# Patient Record
(demographics unavailable — no encounter records)

---

## 2024-10-14 NOTE — PHYSICAL EXAM
[General Appearance - Alert] : alert [General Appearance - In No Acute Distress] : in no acute distress [Auscultation Breath Sounds / Voice Sounds] : lungs were clear to auscultation bilaterally [Heart Rate And Rhythm] : heart rate was normal and rhythm regular [Heart Sounds] : normal S1 and S2 [Heart Sounds Gallop] : no gallops [Murmurs] : no murmurs [Heart Sounds Pericardial Friction Rub] : no pericardial rub [Bowel Sounds] : normal bowel sounds [Abdomen Soft] : soft [Abdomen Tenderness] : non-tender [] : no hepato-splenomegaly [Abdomen Mass (___ Cm)] : no abdominal mass palpated [Costovertebral Angle Tenderness] : no CVA tenderness [FreeTextEntry1] : b/l healed groin wounds. b/l le edema

## 2024-10-14 NOTE — ASSESSMENT
[FreeTextEntry1] : 76 y/o F  with a history of   TEVAR for (TAAA) and AAA s/p EVAR on 10/19 , History of left le Ischemia ( acutely occluded left iliac graft) resulting in Right to left femoral ptfe bypass and left lower ext fasciotomies 11/5, and ORIF for left hip fracture on 11/7.  Patient sent to the ED for evaluation for suspected left groin wound infection.    - s/p left groin washout and sartorius muscle flap >>  Old and acute hematomas found in deep and superficial tissue, serosanguinous drainage -  OR cultures swab and tissue + PsA, f/u (S) cefepime - bcx ngtd - d/w Dr Peter, concern for graft infection which cannot be removed - discharged on cefepime 2 g iv q8h via picc with weekly CBc CMp ESr CRP end 1/19/24.  - she completed abx - had f/u CTA which reported persistent fluid collection adjacent to graft anastomosis in left groin - ESr and crp had improved - since the graft was exposed it is considered chronically infected and ideally this should be removed - spoke to Dr Peter graft removal not feasible and may cause significant morbidity /mortality for the patient - therefore if she does not remain on suppressive antibiotics there is a high change infection can recur and should she become bacteremic she may never clear her cultures which can become life threatening - unfortunately the only oral option for Pseudomonas for suppression is quinolones -which are contraindicated in patients with aneurysms. in this case benefits outweigh risks of use. she will be frequently monitored with labs and ekg for qtc - she has now been on ciprofloxacin and is tolerating it well thus far - will check CBC CMP, CRP ESR, ekg- unable to obtain EKG today she will return for this - she will f/u with vascular surgery for ct every 3-6 months- next CT is next week and then will see Dr Peter after that - she is aware of risk of qtc prolongation, aneurysms, tendon rupture, diarrhea, VOLODYMYR, c diff, death - c/w ciprofloxacin    f/u next month prognosis guarded [Treatment Education] : treatment education [Treatment Adherence] : treatment adherence [Rx Dose / Side Effects] : Rx dose/side effects [Risk Reduction] : risk reduction [Drug Interactions / Side Effects] : drug interactions/side effects [Disclosure of Diagnosis] : disclosure of diagnosis [Anticipatory Guidance] : anticipatory guidance

## 2024-10-14 NOTE — HISTORY OF PRESENT ILLNESS
[FreeTextEntry1] :  Hospital Course: Discharge Date	28-Nov-2023 Admission Date	21-Nov-2023 21:02 Reason for Admission	left groin draining wound Hospital Course	 76 y/o F  with a history of   TEVAR for (TAAA) and AAA s/p EVAR on 10/19 , History of left le Ischemia ( acutely occluded left iliac graft) resulting in Right to left femoral ptfe bypass and left lower ext fasciotomies 11/5, and ORIF for left hip fracture on 11/7.  Patient sent to the ED for evaluation for suspected left groin wound infection.    - s/p left groin washout and sartorius muscle flap >>  Old and acute hematomas found in deep and superficial tissue, serosanguinous drainage -  OR cultures swab and tissue + PsA, f/u (S) cefepime - bcx ngtd - dc zosyn-->cefepime 2g IV q8h - d/w Dr Peter, concern for graft infection which cannot be removed - discharged on cefepime 2 g iv q8h via picc with weekly CBc CMp ESr CRP end 1/19/24.  - she completed abx - had f/u CTA which reported persistent fluid collection adjacent to graft anastomosis in left groin - ESr and crp had improved  patient is doing well and has no fever she has a wound on LLE for which she is getting wound care and was told she may need a vac she was sent here by vascular surgery to discuss chronic antibiotic suppression  Interval f/u 2/7/24- pt is tolerating cipro started probiotics feels some numbness at left groin, no pain  Interval f/u 3/11/24- pt is doing well no diarrhea no symptoms LLE wound healed -has a scab planning to go to [place] beginning of may having f/u Ct after that   Interval f/u 4/15/24 pt here for f/u doing well no diarrhea no fever on probiotics  xarelto changed to plavix doing acupuncture almost done with PT notes some pain at distal lle calf medial scar, states may have banged it going for cruise next month   Interval f/u 6/5/24- doing well no diarrhea saw Dr Peter had Ct- reviewed no signs of infection no fever did note some hip pain has to see [facility] tolerating cipro noted skin peeling only over arms and lower legs x 1 month ? photosensitivity  arms and calf with hypopigmented scars did note weight gain-using probiotics and yogurt not eating much  Interval f/u 10/14/24 here for f/u visit saw derm using lotion for dryness-better stopped tizanadine she had developed back pain from PT for legs, went to see ortho spine, they recommended pain management who prescribed tizanadine. received epidural for back pain which helped somewhat started olmesartan and hctz, urinating frequently noted some discomfort to right lq denies chest pain palpitations fever diarrhea

## 2024-11-05 NOTE — ASSESSMENT
[FreeTextEntry1] : 75-year-old female s/p infrarenal aortic aneurysm repair with thrombosis of left iliac limb requiring a right to left femorofemoral bypass with subsequent infection and sartorius flaps currently on suppressive abx. Patient with recent bilateral PEs. Continues to on AC.  I reviewed her most recent CAT scan.  She likely has a 1B endoleak with sac growth.  Her aneurysm continues into the mesentery with slight increase in size.  Somehow her infrarenal segment is stable.  Treatment of this remaining segment will be quite difficult.  Perhaps she is a candidate for TAMBE graft.  That being said treatment would be difficult and she is on suppressive antibiotics for pseudomonal infection.  I would like her to come to the office next week for evaluation by myself and Dr. Encinas  and further conversations regarding possible treatment options.

## 2024-11-05 NOTE — PHYSICAL EXAM
[de-identified] : Appears well [FreeTextEntry1] : Palpable PT pulses bilateral [de-identified] : Groin incision is clean and intact.

## 2024-11-05 NOTE — HISTORY OF PRESENT ILLNESS
[FreeTextEntry1] : 75-year-old female s/p infrarenal aortic aneurysm repair with thrombosis of left iliac limb requiring a right to left femorofemoral bypass with subsequent infection and sartorius flaps. Patient was recently hospitalized [facility] for bilateral PE findings on CTA chest abd/pelvis, further LLE duplex demonstrated acute DVT gastrocnemius vein. CTA ABd/pelvis with continued endoleaks and unchanged excluded 5.8cm AAA and occlusion Left VY stent with patent bypass based on evaluation of the CAT scan.  There is growth in the descending thoracic component with possibly 1B endoleak.  The visceral component is also increased.. She is being treated with suppressive antibiotics for pseudomonal infection.  In general she just feels tired.

## 2024-11-08 NOTE — CONSULT LETTER
[Dear  ___] : Dear  [unfilled], [Courtesy Letter:] : I had the pleasure of seeing your patient, [unfilled], in my office today. [Please see my note below.] : Please see my note below. [Referral Closing:] : Thank you very much for seeing this patient.  If you have any questions, please do not hesitate to contact me. [Sincerely,] : Sincerely, [FreeTextEntry2] : Dr Mathew [FreeTextEntry3] : Miguel nAgel Loo MD Chief of Cardiovascular & Thoracic Surgery System Director of Endovascular & Cardiovascular Surgery  Cardiovascular & Thoracic Surgery [facility]  [place] [address] [place] [address] Tel   [phone] Fax  [phone]

## 2024-11-08 NOTE — DATA REVIEWED
[FreeTextEntry1] : CTA ABD Pelvis performed on 10/21/2024 showed IMPRESSION: Bilateral pulmonary emboli without evidence cardiac strain.  Thoracic and abdominal aortic aneurysms with indwelling stents and endoleaks similar to prior examination 05/16/2024  Unchanged occlusion left common iliac stent limb with patent femorofemoral bypass graft  Findings discussed with FRANCINE Alberts on 10/21/2024 at 11:00 AM  with read back.

## 2024-11-08 NOTE — CONSULT LETTER
[Dear  ___] : Dear  [unfilled], [Courtesy Letter:] : I had the pleasure of seeing your patient, [unfilled], in my office today. [Please see my note below.] : Please see my note below. [Referral Closing:] : Thank you very much for seeing this patient.  If you have any questions, please do not hesitate to contact me. [Sincerely,] : Sincerely, [FreeTextEntry2] : Dr Mathew [FreeTextEntry3] : Miguel Angel Loo MD Chief of Cardiovascular & Thoracic Surgery System Director of Endovascular & Cardiovascular Surgery  Cardiovascular & Thoracic Surgery [facility]  [place] [address] [place] [address] Tel   [phone] Fax  [phone]

## 2024-11-08 NOTE — REASON FOR VISIT
[Follow-Up: _____] : a [unfilled] follow-up visit [FreeTextEntry1] : excluded 5.8 cm AAA and occlusion left VY stent with patent bypass

## 2024-11-11 NOTE — PHYSICAL EXAM
[Sclera] : the sclera and conjunctiva were normal [Neck Appearance] : the appearance of the neck was normal [] : no respiratory distress [Apical Impulse] : the apical impulse was normal [Examination Of The Chest] : the chest was normal in appearance [2+] : left 2+ [Breast Appearance] : normal in appearance [Bowel Sounds] : normal bowel sounds [External Female Genitalia] : normal external genitalia [Cervical Lymph Nodes Enlarged Posterior Bilaterally] : posterior cervical [No CVA Tenderness] : no ~M costovertebral angle tenderness [Abnormal Walk] : normal gait [Skin Color & Pigmentation] : normal skin color and pigmentation [Cranial Nerves] : cranial nerves 2-12 were intact [Oriented To Time, Place, And Person] : oriented to person, place, and time [de-identified] : Appears well [FreeTextEntry1] : Positive bilateral DP PT signals [de-identified] : Bilateral groin incisions are well-healed.

## 2024-11-11 NOTE — HISTORY OF PRESENT ILLNESS
[FreeTextEntry1] :      Mrs. Meeks is a 76-year-old female presenting today for a follow-up CT scan regarding abdominal aortic aneurysm. Most recent CT scan showed a 5.8 cm AAA and an occluded left VY stent, with a patent bypass. Her history includes a prior infrarenal aortic aneurysm repair 10/19/2023, complicated by thrombosis of the iliac limb, which required a right-to-left femorofemoral bypass, followed by infection and sartorius flaps. Recently, she was hospitalized at [facility] for bilateral pulmonary emboli found on CTA of the chest, abdomen, and pelvis. A LLE duplex revealed an acute DVT in the gastrocnemius vein currently on eliquis). Her latest CTA abdomen/pelvis showed continued endoleaks, an unchanged 5.8 cm AAA, and persistent occlusion of the left VY stent with a patent bypass. There is noted growth in the descending thoracic component, possibly indicating a Type 1B endoleak, as well as an increase in the visceral component. She is currently on suppressive antibiotics for a pseudomonal infection. Her medical history is notable for HTN, HLD, myocardial infarctions, and prediabetes. Today, she reports feeling well and offers no complaints.       Today, I had the pleasure of evaluating Mrs. Amaro during her follow-up office visit. She presented to the office in to review the recent surveillance CT scan related to her AAA. Her latest CT scan revealed a 5.8 cm AAA and an occluded left VY stent, with a patent bypass.  Her medical history includes a previous infrarenal aortic aneurysm repair on 10/19/2023, which was complicated by thrombosis of the iliac limb, requiring a right-to-left femorofemoral bypass, followed by infection and sartorius flaps. Recently, she was hospitalized at [facility] for bilateral pulmonary emboli, discovered through a CTA of the chest, abdomen, and pelvis. A lower left extremity (LLE) duplex revealed an acute DVT in the gastrocnemius vein, for which she is currently on Eliquis. Her latest CTA of the abdomen and pelvis showed ongoing endoleaks, an unchanged 5.8 cm AAA, and a persistent occlusion of the left VY stent with a patent bypass. Additionally, there is growth noted in the descending thoracic component, which may indicate a Type 1B endoleak, and an increase in the visceral component. She is currently on suppressive antibiotics to treat a pseudomonal infection. At this time, I am recommending surgical intervention with a TAMBE graft; however, we will need to wait until we have completed the necessary training and have access to the graft, anticipated to be available in late winter or early spring. Mrs. Amaro expressed an understanding of the current plan and agreed to adhere to it.  Plan: -Obtain Mrs. Amaro's latest CT C/A/P on CD from [facility]. -She will require a TAMBIE graft, but we will wait until we are fully trained and have access to the graft before proceeding. -We will hold off on repeating her CTA until we have a clearer timeline for the procedure, likely targeting late winter or early spring.

## 2024-11-11 NOTE — ASSESSMENT
[FreeTextEntry1] : Today, I had the pleasure of evaluating Mrs. Amaro during her follow-up office visit. She presented to the office in to review the recent surveillance CT scan related to her AAA. Her latest CT scan revealed a 5.8 cm AAA and an occluded left VY stent, with a patent bypass.  Her medical history includes a previous infrarenal aortic aneurysm repair on 10/19/2023, which was complicated by thrombosis of the iliac limb, requiring a right-to-left femorofemoral bypass, followed by infection and sartorius flaps. Recently, she was hospitalized at [facility] for bilateral pulmonary emboli, discovered through a CTA of the chest, abdomen, and pelvis. A lower left extremity (LLE) duplex revealed an acute DVT in the gastrocnemius vein, for which she is currently on Eliquis. Her latest CTA of the abdomen and pelvis showed ongoing endoleaks, an unchanged 5.8 cm AAA, and a persistent occlusion of the left VY stent with a patent bypass. Additionally, there is growth noted in the descending thoracic component, which may indicate a Type 1B endoleak, and an increase in the visceral component. She is currently on suppressive antibiotics to treat a pseudomonal infection. At this time, I am recommending surgical intervention with a TAMBE graft; however, we will need to wait until we have completed the necessary training and have access to the graft, anticipated to be available in late winter or early spring. Mrs. Amaro expressed an understanding of the current plan and agreed to adhere to it.  Plan: -Obtain Mrs. Amaro's latest CT C/A/P on CD from [facility]. -She will require a TAMBIE graft, but we will wait until we are fully trained and have access to the graft before proceeding. -We will hold off on repeating her CTA until we have a clearer timeline for the procedure, likely targeting late winter or early spring.

## 2024-11-11 NOTE — PHYSICAL EXAM
[Sclera] : the sclera and conjunctiva were normal [Neck Appearance] : the appearance of the neck was normal [] : no respiratory distress [Apical Impulse] : the apical impulse was normal [Examination Of The Chest] : the chest was normal in appearance [2+] : left 2+ [Breast Appearance] : normal in appearance [Bowel Sounds] : normal bowel sounds [External Female Genitalia] : normal external genitalia [Cervical Lymph Nodes Enlarged Posterior Bilaterally] : posterior cervical [No CVA Tenderness] : no ~M costovertebral angle tenderness [Abnormal Walk] : normal gait [Skin Color & Pigmentation] : normal skin color and pigmentation [Cranial Nerves] : cranial nerves 2-12 were intact [Oriented To Time, Place, And Person] : oriented to person, place, and time [de-identified] : Appears well [FreeTextEntry1] : Positive bilateral DP PT signals [de-identified] : Bilateral groin incisions are well-healed.

## 2024-11-11 NOTE — HISTORY OF PRESENT ILLNESS
[FreeTextEntry1] :      Mrs. Meeks is a 76-year-old female presenting today for a follow-up CT scan regarding abdominal aortic aneurysm. Most recent CT scan showed a 5.8 cm AAA and an occluded left VY stent, with a patent bypass. Her history includes a prior infrarenal aortic aneurysm repair 10/19/2023, complicated by thrombosis of the iliac limb, which required a right-to-left femorofemoral bypass, followed by infection and sartorius flaps. Recently, she was hospitalized at [facility] for bilateral pulmonary emboli found on CTA of the chest, abdomen, and pelvis. A LLE duplex revealed an acute DVT in the gastrocnemius vein currently on eliquis). Her latest CTA abdomen/pelvis showed continued endoleaks, an unchanged 5.8 cm AAA, and persistent occlusion of the left VY stent with a patent bypass. There is noted growth in the descending thoracic component, possibly indicating a Type 1B endoleak, as well as an increase in the visceral component. She is currently on suppressive antibiotics for a pseudomonal infection. Her medical history is notable for HTN, HLD, myocardial infarctions, and prediabetes. Today, she reports feeling well and offers no complaints.       Today, I had the pleasure of evaluating Mrs. Amaro during her follow-up office visit. She presented to the office in to review the recent surveillance CT scan related to her AAA. Her latest CT scan revealed a 5.8 cm AAA and an occluded left VY stent, with a patent bypass.  Her medical history includes a previous infrarenal aortic aneurysm repair on 10/19/2023, which was complicated by thrombosis of the iliac limb, requiring a right-to-left femorofemoral bypass, followed by infection and sartorius flaps. Recently, she was hospitalized at [facility] for bilateral pulmonary emboli, discovered through a CTA of the chest, abdomen, and pelvis. A lower left extremity (LLE) duplex revealed an acute DVT in the gastrocnemius vein, for which she is currently on Eliquis. Her latest CTA of the abdomen and pelvis showed ongoing endoleaks, an unchanged 5.8 cm AAA, and a persistent occlusion of the left VY stent with a patent bypass. Additionally, there is growth noted in the descending thoracic component, which may indicate a Type 1B endoleak, and an increase in the visceral component. She is currently on suppressive antibiotics to treat a pseudomonal infection. At this time, I am recommending surgical intervention with a TAMBE graft; however, we will need to wait until we have completed the necessary training and have access to the graft, anticipated to be available in late winter or early spring. Mrs. Amaro expressed an understanding of the current plan and agreed to adhere to it.  Plan: -Obtain Mrs. Amaro's latest CT C/A/P on CD from [facility]. -She will require a TAMBIE graft, but we will wait until we are fully trained and have access to the graft before proceeding. -We will hold off on repeating her CTA until we have a clearer timeline for the procedure, likely targeting late winter or early spring.

## 2024-11-11 NOTE — ASSESSMENT
[FreeTextEntry1] : Today, I had the pleasure of evaluating Mrs. Amaro during her follow-up office visit. She presented to the office in to review the recent surveillance CT scan related to her AAA. Her latest CT scan revealed a 5.8 cm AAA and an occluded left VY stent, with a patent bypass.  Her medical history includes a previous infrarenal aortic aneurysm repair on 10/19/2023, which was complicated by thrombosis of the iliac limb, requiring a right-to-left femorofemoral bypass, followed by infection and sartorius flaps. Recently, she was hospitalized at [facility] for bilateral pulmonary emboli, discovered through a CTA of the chest, abdomen, and pelvis. A lower left extremity (LLE) duplex revealed an acute DVT in the gastrocnemius vein, for which she is currently on Eliquis. Her latest CTA of the abdomen and pelvis showed ongoing endoleaks, an unchanged 5.8 cm AAA, and a persistent occlusion of the left VY stent with a patent bypass. Additionally, there is growth noted in the descending thoracic component, which may indicate a Type 1B endoleak, and an increase in the visceral component. She is currently on suppressive antibiotics to treat a pseudomonal infection. At this time, I am recommending surgical intervention with a TAMBE graft; however, we will need to wait until we have completed the necessary training and have access to the graft, anticipated to be available in late winter or early spring. Mrs. Amaro expressed an understanding of the current plan and agreed to adhere to it.  Plan: -Obtain Mrs. Amaro's latest CT C/A/P on CD from [facility]. -She will require a TAMBIE graft, but we will wait until we are fully trained and have access to the graft before proceeding. -We will hold off on repeating her CTA until we have a clearer timeline for the procedure, likely targeting late winter or early spring.

## 2024-11-22 NOTE — ASSESSMENT
[FreeTextEntry1] : Mrs. Meeks is a 76-year-old female presenting today for a follow-up CT scan regarding abdominal aortic aneurysm previously treated with EVAR now with thoracic extension and endoleak.  Her medical history is notable for HTN, HLD, myocardial infarctions, and prediabetes.  Her medical history includes a previous infrarenal aortic aneurysm repair on 10/19/2023, which was complicated by thrombosis of the iliac limb, requiring a right-to-left femorofemoral bypass, followed by infection and sartorius flaps. Recently, she was hospitalized at [facility] for bilateral pulmonary emboli, discovered through a CTA of the chest, abdomen, and pelvis. A lower left extremity (LLE) duplex revealed an acute DVT in the gastrocnemius vein, for which she is currently on Eliquis. Her latest CTA of the abdomen and pelvis showed ongoing endoleaks, an unchanged 5.8 cm AAA, and a persistent occlusion of the left VY stent with a patent bypass. Additionally, there is growth noted in the descending thoracic component, which may indicate a Type 1B endoleak, and an increase in the visceral component. She is currently on suppressive antibiotics to treat a pseudomonal infection. At this time, I am recommending surgical intervention with a TAMBE graft; however, we will need to wait until we have completed the necessary training and have access to the graft, anticipated to be available in late winter or early spring. Mrs. Amaro expressed an understanding of the current plan and agreed to adhere to it.  Plan: -Obtain Mrs. Amaro's latest CT C/A/P on CD from [facility]. -She will require a TAMBIE graft, but we will wait until we are fully trained and have access to the graft before proceeding. -We will hold off on repeating her CTA until we have a clearer timeline for the procedure, likely targeting late winter or early spring.    I, Dr. Loo personally performed the evaluation and management (E/M) services for this patient. That E/M includes conducting the initial examination, assessing all conditions, and establishing the plan of care. Today, Roberto Carlos Ellison NP was here to observe my evaluation and management services for this patient.

## 2024-11-22 NOTE — DATA REVIEWED
[FreeTextEntry1] : Independent review of the following imaging and independent interpretation was performed at today's visit: - 10/30/24 CT C/A/P at [facility]

## 2024-11-22 NOTE — ASSESSMENT
[FreeTextEntry1] : Mrs. Meeks is a 76-year-old female presenting today for a follow-up CT scan regarding abdominal aortic aneurysm previously treated with EVAR now with thoracic extension and endoleak.  Her medical history is notable for HTN, HLD, myocardial infarctions, and prediabetes.  Her medical history includes a previous infrarenal aortic aneurysm repair on 10/19/2023, which was complicated by thrombosis of the iliac limb, requiring a right-to-left femorofemoral bypass, followed by infection and sartorius flaps. Recently, she was hospitalized at [facility] for bilateral pulmonary emboli, discovered through a CTA of the chest, abdomen, and pelvis. A lower left extremity (LLE) duplex revealed an acute DVT in the gastrocnemius vein, for which she is currently on Eliquis. Her latest CTA of the abdomen and pelvis showed ongoing endoleaks, an unchanged 5.8 cm AAA, and a persistent occlusion of the left VY stent with a patent bypass. Additionally, there is growth noted in the descending thoracic component, which may indicate a Type 1B endoleak, and an increase in the visceral component. She is currently on suppressive antibiotics to treat a pseudomonal infection. At this time, I am recommending surgical intervention with a TAMBE graft; however, we will need to wait until we have completed the necessary training and have access to the graft, anticipated to be available in late winter or early spring. Mrs. Amaro expressed an understanding of the current plan and agreed to adhere to it.  Plan: -Obtain Mrs. Amaro's latest CT C/A/P on CD from Brian Dunphy. -She will require a TAMBIE graft, but we will wait until we are fully trained and have access to the graft before proceeding. -We will hold off on repeating her CTA until we have a clearer timeline for the procedure, likely targeting late winter or early spring.    I, Dr. Loo personally performed the evaluation and management (E/M) services for this patient. That E/M includes conducting the initial examination, assessing all conditions, and establishing the plan of care. Today, Roberto Carlos Ellison NP was here to observe my evaluation and management services for this patient.

## 2024-11-22 NOTE — PHYSICAL EXAM
[General Appearance - Alert] : alert [General Appearance - In No Acute Distress] : in no acute distress [Auscultation Breath Sounds / Voice Sounds] : lungs were clear to auscultation bilaterally [Heart Rate And Rhythm] : heart rate was normal and rhythm regular [Heart Sounds] : normal S1 and S2 [Heart Sounds Gallop] : no gallops [Murmurs] : no murmurs [Heart Sounds Pericardial Friction Rub] : no pericardial rub [Bowel Sounds] : normal bowel sounds [Abdomen Soft] : soft [Abdomen Tenderness] : non-tender [] : no hepato-splenomegaly [Abdomen Mass (___ Cm)] : no abdominal mass palpated [Costovertebral Angle Tenderness] : no CVA tenderness [FreeTextEntry1] : left anterior calf healed scar medial calf healed scar. no erythema warmth or open wounds

## 2024-11-22 NOTE — HISTORY OF PRESENT ILLNESS
[FreeTextEntry1] :  Hospital Course: Discharge Date	28-Nov-2023 Admission Date	21-Nov-2023 21:02 Reason for Admission	left groin draining wound Hospital Course	 76 y/o F  with a history of   TEVAR for (TAAA) and AAA s/p EVAR on 10/19 , History of left le Ischemia ( acutely occluded left iliac graft) resulting in Right to left femoral ptfe bypass and left lower ext fasciotomies 11/5, and ORIF for left hip fracture on 11/7.  Patient sent to the ED for evaluation for suspected left groin wound infection.    - s/p left groin washout and sartorius muscle flap >>  Old and acute hematomas found in deep and superficial tissue, serosanguinous drainage -  OR cultures swab and tissue + PsA, f/u (S) cefepime - bcx ngtd - dc zosyn-->cefepime 2g IV q8h - d/w Dr Peter, concern for graft infection which cannot be removed - discharged on cefepime 2 g iv q8h via picc with weekly CBc CMp ESr CRP end 1/19/24.  - she completed abx - had f/u CTA which reported persistent fluid collection adjacent to graft anastomosis in left groin - ESr and crp had improved  patient is doing well and has no fever she has a wound on LLE for which she is getting wound care and was told she may need a vac she was sent here by vascular surgery to discuss chronic antibiotic suppression  Interval f/u 2/7/24- pt is tolerating cipro started probiotics feels some numbness at left groin, no pain  Interval f/u 3/11/24- pt is doing well no diarrhea no symptoms LLE wound healed -has a scab planning to go to [place] beginning of may having f/u Ct after that   Interval f/u 4/15/24 pt here for f/u doing well no diarrhea no fever on probiotics  xarelto changed to plavix doing acupuncture almost done with PT notes some pain at distal lle calf medial scar, states may have banged it going for cruise next month   Interval f/u 6/5/24- doing well no diarrhea saw Dr Peter had Ct- reviewed no signs of infection no fever did note some hip pain has to see [facility] tolerating cipro noted skin peeling only over arms and lower legs x 1 month ? photosensitivity  arms and calf with hypopigmented scars did note weight gain-using probiotics and yogurt not eating much  Interval f/u 11/18/24- pt underwent CTA found to have ongoing endoleaks and growth in descending thoracic aneursym she is being evaluated for TAMBE graft procedure likely mid winter-spring she is very nervous about complications and will be seeing Dr Loo again Friday to discuss compliant with cipro, not happy about the weight gain which she attributes to it no fever no diarrhea found to have PE and DVT, on eliquis

## 2024-11-22 NOTE — DATA REVIEWED
[FreeTextEntry1] : Independent review of the following imaging and independent interpretation was performed at today's visit: - 10/30/24 CT C/A/P at [facility]

## 2024-11-22 NOTE — HISTORY OF PRESENT ILLNESS
[FreeTextEntry1] : Mrs. Meeks is a 76-year-old female presenting today for a follow-up CT scan regarding abdominal aortic aneurysm previously treated with EVAR now with thoracic extension and endoleak.  Her medical history is notable for HTN, HLD, myocardial infarctions, and prediabetes.  11/8/24 Office visit: Prior CT scan showed a 5.8 cm AAA and an occluded left VY stent, with a patent bypass. Her history includes a prior infrarenal aortic aneurysm repair 10/19/2023, complicated by thrombosis of the iliac limb, which required a right-to-left femorofemoral bypass, followed by infection and sartorius flaps. Recently, she was hospitalized at [facility] for bilateral pulmonary emboli found on CTA of the chest, abdomen, and pelvis. A LLE duplex revealed an acute DVT in the gastrocnemius vein currently on eliquis). Her latest CTA abdomen/pelvis showed continued endoleaks, an unchanged 5.8 cm AAA, and persistent occlusion of the left VY stent with a patent bypass. There is noted growth in the descending thoracic component, possibly indicating a Type 1B endoleak, as well as an increase in the visceral component. She is currently on suppressive antibiotics for a pseudomonal infection.  10/30/24 CTA C/A/P at [facility] - Bilateral pulmonary emboli without evidence cardiac strain - Thoracic and abdominal aortic aneurysms with indwelling stents and endoleaks similar to prior examination 5/16/24 - Unchanged occlusion left common iliac stent limb with patent femorofemoral bypass graft  Today she reports for follow up after reviewing the recent CT to discuss possible surgical intervention.  Cardiology Dr Gonzalez

## 2024-11-22 NOTE — HISTORY OF PRESENT ILLNESS
[FreeTextEntry1] :  Hospital Course: Discharge Date	28-Nov-2023 Admission Date	21-Nov-2023 21:02 Reason for Admission	left groin draining wound Hospital Course	 74 y/o F  with a history of   TEVAR for (TAAA) and AAA s/p EVAR on 10/19 , History of left le Ischemia ( acutely occluded left iliac graft) resulting in Right to left femoral ptfe bypass and left lower ext fasciotomies 11/5, and ORIF for left hip fracture on 11/7.  Patient sent to the ED for evaluation for suspected left groin wound infection.    - s/p left groin washout and sartorius muscle flap >>  Old and acute hematomas found in deep and superficial tissue, serosanguinous drainage -  OR cultures swab and tissue + PsA, f/u (S) cefepime - bcx ngtd - dc zosyn-->cefepime 2g IV q8h - d/w Dr Peter, concern for graft infection which cannot be removed - discharged on cefepime 2 g iv q8h via picc with weekly CBc CMp ESr CRP end 1/19/24.  - she completed abx - had f/u CTA which reported persistent fluid collection adjacent to graft anastomosis in left groin - ESr and crp had improved  patient is doing well and has no fever she has a wound on LLE for which she is getting wound care and was told she may need a vac she was sent here by vascular surgery to discuss chronic antibiotic suppression  Interval f/u 2/7/24- pt is tolerating cipro started probiotics feels some numbness at left groin, no pain  Interval f/u 3/11/24- pt is doing well no diarrhea no symptoms LLE wound healed -has a scab planning to go to [place] beginning of may having f/u Ct after that   Interval f/u 4/15/24 pt here for f/u doing well no diarrhea no fever on probiotics  xarelto changed to plavix doing acupuncture almost done with PT notes some pain at distal lle calf medial scar, states may have banged it going for cruise next month   Interval f/u 6/5/24- doing well no diarrhea saw Dr Peter had Ct- reviewed no signs of infection no fever did note some hip pain has to see [facility] tolerating cipro noted skin peeling only over arms and lower legs x 1 month ? photosensitivity  arms and calf with hypopigmented scars did note weight gain-using probiotics and yogurt not eating much  Interval f/u 11/18/24- pt underwent CTA found to have ongoing endoleaks and growth in descending thoracic aneursym she is being evaluated for TAMBE graft procedure likely mid winter-spring she is very nervous about complications and will be seeing Dr Loo again Friday to discuss compliant with cipro, not happy about the weight gain which she attributes to it no fever no diarrhea found to have PE and DVT, on eliquis

## 2024-11-22 NOTE — HISTORY OF PRESENT ILLNESS
[FreeTextEntry1] : Mrs. Meeks is a 76-year-old female presenting today for a follow-up CT scan regarding abdominal aortic aneurysm previously treated with EVAR now with thoracic extension and endoleak.  Her medical history is notable for HTN, HLD, myocardial infarctions, and prediabetes.  11/8/24 Office visit: Prior CT scan showed a 5.8 cm AAA and an occluded left VY stent, with a patent bypass. Her history includes a prior infrarenal aortic aneurysm repair 10/19/2023, complicated by thrombosis of the iliac limb, which required a right-to-left femorofemoral bypass, followed by infection and sartorius flaps. Recently, she was hospitalized at [facility] for bilateral pulmonary emboli found on CTA of the chest, abdomen, and pelvis. A LLE duplex revealed an acute DVT in the gastrocnemius vein currently on eliquis). Her latest CTA abdomen/pelvis showed continued endoleaks, an unchanged 5.8 cm AAA, and persistent occlusion of the left VY stent with a patent bypass. There is noted growth in the descending thoracic component, possibly indicating a Type 1B endoleak, as well as an increase in the visceral component. She is currently on suppressive antibiotics for a pseudomonal infection.  10/30/24 CTA C/A/P at [facility] - Bilateral pulmonary emboli without evidence cardiac strain - Thoracic and abdominal aortic aneurysms with indwelling stents and endoleaks similar to prior examination 5/16/24 - Unchanged occlusion left common iliac stent limb with patent femorofemoral bypass graft  Today she reports for follow up after reviewing the recent CT to discuss possible surgical intervention.  Cardiology Dr Gonzalez

## 2024-11-22 NOTE — REVIEW OF SYSTEMS
[Negative] : Heme/Lymph [Feeling Poorly] : not feeling poorly [Feeling Tired] : not feeling tired [Chest Pain] : no chest pain [SOB on Exertion] : no shortness of breath during exertion [Abdominal Pain] : no abdominal pain

## 2024-11-22 NOTE — ASSESSMENT
[FreeTextEntry1] : 76 y/o F  with a history of   TEVAR for (TAAA) and AAA s/p EVAR on 10/19 , History of left le Ischemia ( acutely occluded left iliac graft) resulting in Right to left femoral ptfe bypass and left lower ext fasciotomies 11/5, and ORIF for left hip fracture on 11/7.  Patient sent to the ED for evaluation for suspected left groin wound infection.    - s/p left groin washout and sartorius muscle flap >>  Old and acute hematomas found in deep and superficial tissue, serosanguinous drainage -  OR cultures swab and tissue + PsA, f/u (S) cefepime keshawn 0.25 - bcx ngtd -  previously d/w Dr Peter, concern for PTFE graft infection which cannot be removed - discharged on cefepime 2 g iv q8h via picc with weekly CBc CMp ESr CRP end 1/19/24.  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 and ideally this should be removed - spoke to Dr Peter graft removal not feasible and may cause significant morbidity /mortality for the patient - therefore if she does not remain on suppressive antibiotics there is a high change infection can recur and should she become bacteremic she may never clear her cultures which can become life threatening - unfortunately the only oral option for Pseudomonas for suppression is quinolones - these have been associated with aneurysms. will discuss with vascular - she already has TEVAR, EVAR PTFE grafts and left hip THR- many potential sights of seeding if infection recurs which will be very difficult to clear - in this case benefits outweigh risks of use. she will be frequently monitored with labs and ekg for qtc - she has now been on ciprofloxacin and is tolerating it well thus far - will check CBC CMP, CRP ESR, ekg q 6 weeks - she will see [facility] and vascular this week to discuss TAME graft - she is aware of risk of qtc prolongation, aneurysms, tendon rupture, diarrhea, VOLODYMYR, c diff, death - c/w ciprofloxacin - check cbc cmp esr crp - ekg reviewed qtc acceptable   f/u 1 month prognosis guarded [Treatment Education] : treatment education [Treatment Adherence] : treatment adherence [Rx Dose / Side Effects] : Rx dose/side effects [Risk Reduction] : risk reduction [Drug Interactions / Side Effects] : drug interactions/side effects [Disclosure of Diagnosis] : disclosure of diagnosis [Anticipatory Guidance] : anticipatory guidance

## 2024-11-22 NOTE — ASSESSMENT
[FreeTextEntry1] : 74 y/o F  with a history of   TEVAR for (TAAA) and AAA s/p EVAR on 10/19 , History of left le Ischemia ( acutely occluded left iliac graft) resulting in Right to left femoral ptfe bypass and left lower ext fasciotomies 11/5, and ORIF for left hip fracture on 11/7.  Patient sent to the ED for evaluation for suspected left groin wound infection.    - s/p left groin washout and sartorius muscle flap >>  Old and acute hematomas found in deep and superficial tissue, serosanguinous drainage -  OR cultures swab and tissue + PsA, f/u (S) cefepime keshawn 0.25 - bcx ngtd -  previously d/w Dr Peter, concern for PTFE graft infection which cannot be removed - discharged on cefepime 2 g iv q8h via picc with weekly CBc CMp ESr CRP end 1/19/24.  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 and ideally this should be removed - spoke to Dr Peter graft removal not feasible and may cause significant morbidity /mortality for the patient - therefore if she does not remain on suppressive antibiotics there is a high change infection can recur and should she become bacteremic she may never clear her cultures which can become life threatening - unfortunately the only oral option for Pseudomonas for suppression is quinolones - these have been associated with aneurysms. will discuss with vascular - she already has TEVAR, EVAR PTFE grafts and left hip THR- many potential sights of seeding if infection recurs which will be very difficult to clear - in this case benefits outweigh risks of use. she will be frequently monitored with labs and ekg for qtc - she has now been on ciprofloxacin and is tolerating it well thus far - will check CBC CMP, CRP ESR, ekg q 6 weeks - she will see [facility] and vascular this week to discuss TAME graft - she is aware of risk of qtc prolongation, aneurysms, tendon rupture, diarrhea, VOLODYMYR, c diff, death - c/w ciprofloxacin - check cbc cmp esr crp - ekg reviewed qtc acceptable   f/u 1 month prognosis guarded [Treatment Education] : treatment education [Treatment Adherence] : treatment adherence [Rx Dose / Side Effects] : Rx dose/side effects [Risk Reduction] : risk reduction [Drug Interactions / Side Effects] : drug interactions/side effects [Disclosure of Diagnosis] : disclosure of diagnosis [Anticipatory Guidance] : anticipatory guidance

## 2025-02-12 NOTE — ASSESSMENT
[Treatment Education] : treatment education [Treatment Adherence] : treatment adherence [Rx Dose / Side Effects] : Rx dose/side effects [Risk Reduction] : risk reduction [Drug Interactions / Side Effects] : drug interactions/side effects [Disclosure of Diagnosis] : disclosure of diagnosis [Anticipatory Guidance] : anticipatory guidance [FreeTextEntry1] : 74 y/o F  with a history of   TEVAR for (TAAA) and AAA s/p EVAR on 10/19 , History of left le Ischemia ( acutely occluded left iliac graft) resulting in Right to left femoral ptfe bypass and left lower ext fasciotomies 11/5, and ORIF for left hip fracture on 11/7.  Patient was sent to the ED for evaluation for suspected left groin wound infection.    - s/p left groin washout and sartorius muscle flap >>  Old and acute hematomas found in deep and superficial tissue, serosanguinous drainage -  OR cultures swab and tissue + PsA, f/u (S) cefepime keshawn 0.25 - bcx ngtd -  previously d/w Dr Peter, concern for PTFE graft infection which cannot be removed - discharged on cefepime 2 g iv q8h via picc with weekly CBc CMp ESr CRP end 1/19/24.  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 and ideally this should be removed - spoke to Dr Peter graft removal not feasible and may cause significant morbidity /mortality for the patient - therefore if she does not remain on suppressive antibiotics there is a high change infection can recur and should she become bacteremic she may never clear her cultures which can become life threatening - unfortunately the only oral option for Pseudomonas for suppression is quinolones - these have been associated with aneurysms - she already has TEVAR, EVAR PTFE grafts and left hip THR- many potential sights of seeding if infection recurs which will be very difficult to clear - she is awaiting TAMBIE graft placement due to ongoing endoleaks noted on latest CT. to be scheduled with [facility] and vascular surgery - in this case benefits outweigh risks of use. she will be frequently monitored with labs and ekg for qtc - she has now been on ciprofloxacin and is tolerating it well thus far - will check CBC CMP, CRP ESR, ekg  - she is aware of risk of qtc prolongation, aneurysms, tendon rupture, diarrhea, VOLODYMYR, c diff, death - c/w ciprofloxacin  po 250 mg bid - ekg reviewed qtc acceptable - f/u vascular to discuss repeat CTA  f/u 3 months prognosis guarded

## 2025-02-12 NOTE — ASSESSMENT
[Treatment Education] : treatment education [Treatment Adherence] : treatment adherence [Rx Dose / Side Effects] : Rx dose/side effects [Risk Reduction] : risk reduction [Drug Interactions / Side Effects] : drug interactions/side effects [Disclosure of Diagnosis] : disclosure of diagnosis [Anticipatory Guidance] : anticipatory guidance [FreeTextEntry1] : 76 y/o F  with a history of   TEVAR for (TAAA) and AAA s/p EVAR on 10/19 , History of left le Ischemia ( acutely occluded left iliac graft) resulting in Right to left femoral ptfe bypass and left lower ext fasciotomies 11/5, and ORIF for left hip fracture on 11/7.  Patient was sent to the ED for evaluation for suspected left groin wound infection.    - s/p left groin washout and sartorius muscle flap >>  Old and acute hematomas found in deep and superficial tissue, serosanguinous drainage -  OR cultures swab and tissue + PsA, f/u (S) cefepime keshawn 0.25 - bcx ngtd -  previously d/w Dr Peter, concern for PTFE graft infection which cannot be removed - discharged on cefepime 2 g iv q8h via picc with weekly CBc CMp ESr CRP end 1/19/24.  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 and ideally this should be removed - spoke to Dr Peter graft removal not feasible and may cause significant morbidity /mortality for the patient - therefore if she does not remain on suppressive antibiotics there is a high change infection can recur and should she become bacteremic she may never clear her cultures which can become life threatening - unfortunately the only oral option for Pseudomonas for suppression is quinolones - these have been associated with aneurysms - she already has TEVAR, EVAR PTFE grafts and left hip THR- many potential sights of seeding if infection recurs which will be very difficult to clear - she is awaiting TAMBIE graft placement due to ongoing endoleaks noted on latest CT. to be scheduled with [facility] and vascular surgery - in this case benefits outweigh risks of use. she will be frequently monitored with labs and ekg for qtc - she has now been on ciprofloxacin and is tolerating it well thus far - will check CBC CMP, CRP ESR, ekg  - she is aware of risk of qtc prolongation, aneurysms, tendon rupture, diarrhea, VOLODYMYR, c diff, death - c/w ciprofloxacin  po 250 mg bid - ekg reviewed qtc acceptable - f/u vascular to discuss repeat CTA  f/u 3 months prognosis guarded

## 2025-02-12 NOTE — HISTORY OF PRESENT ILLNESS
[FreeTextEntry1] :  Hospital Course: Discharge Date	28-Nov-2023 Admission Date	21-Nov-2023 21:02 Reason for Admission	left groin draining wound Hospital Course	 76 y/o F  with a history of   TEVAR for (TAAA) and AAA s/p EVAR on 10/19 , History of left le Ischemia ( acutely occluded left iliac graft) resulting in Right to left femoral ptfe bypass and left lower ext fasciotomies 11/5, and ORIF for left hip fracture on 11/7.  Patient sent to the ED for evaluation for suspected left groin wound infection.    - s/p left groin washout and sartorius muscle flap >>  Old and acute hematomas found in deep and superficial tissue, serosanguinous drainage -  OR cultures swab and tissue + PsA, f/u (S) cefepime - bcx ngtd - dc zosyn-->cefepime 2g IV q8h - d/w Dr Peter, concern for graft infection which cannot be removed - discharged on cefepime 2 g iv q8h via picc with weekly CBc CMp ESr CRP end 1/19/24.  - she completed abx - had f/u CTA which reported persistent fluid collection adjacent to graft anastomosis in left groin - ESr and crp had improved  patient is doing well and has no fever she has a wound on LLE for which she is getting wound care and was told she may need a vac she was sent here by vascular surgery to discuss chronic antibiotic suppression  Interval f/u 2/7/24- pt is tolerating cipro started probiotics feels some numbness at left groin, no pain  Interval f/u 3/11/24- pt is doing well no diarrhea no symptoms LLE wound healed -has a scab planning to go to [place] beginning of may having f/u Ct after that   Interval f/u 4/15/24 pt here for f/u doing well no diarrhea no fever on probiotics  xarelto changed to plavix doing acupuncture almost done with PT notes some pain at distal lle calf medial scar, states may have banged it going for cruise next month   Interval f/u 6/5/24- doing well no diarrhea saw Dr Peter had Ct- reviewed no signs of infection no fever did note some hip pain has to see [facility] tolerating cipro noted skin peeling only over arms and lower legs x 1 month ? photosensitivity  arms and calf with hypopigmented scars did note weight gain-using probiotics and yogurt not eating much  Interval f/u 11/18/24- pt underwent CTA found to have ongoing endoleaks and growth in descending thoracic aneursym she is being evaluated for TAMBE graft procedure likely mid winter-spring she is very nervous about complications and will be seeing Dr Loo again Friday to discuss compliant with cipro, not happy about the weight gain which she attributes to it no fever no diarrhea found to have PE and DVT, on eliquis  Interval f/u 2/10/25- pt is here for f/u visit tolerating cipro no diarrhea still has some pain in LLE , worse since stopping PT

## 2025-02-26 NOTE — ASSESSMENT
[FreeTextEntry1] : Patient with left thigh pain likely secondary to lumbar radiculopathy.  I believe at this point she would be perfectly fine holding anticoagulation for any planned spine procedures which would be of the minimally invasive nature.  She should restart anticoagulation soon as possible post procedure.  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  At that time she may be a candidate for a TAMBE device.  Patient aware that even with this technology treatment would be rather difficult and risky.  I have discussed this with her  and her on many occasions.  Will have her follow-up in the aortic clinic in October. Prescription for physical therapy provided hopefully this can help for her deconditioning and back issues. CT angiogram chest abdomen pelvis ordered as well for October.  .

## 2025-02-26 NOTE — HISTORY OF PRESENT ILLNESS
[FreeTextEntry1] : Patient here for follow-up.  She has known thoracoabdominal aneurysm.  She has a known untreated Azael mesenteric segment.  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  She denies any back or abdominal pain she is experiencing burning pain in the left thigh.  She was scheduled at 1 point for epidural perhaps cryoablation's but this was not performed because of a DVT.  She has been on anticoagulation for over 6 months and she is here to discuss whether she can hold anticoagulation for her planned procedures.

## 2025-02-26 NOTE — PHYSICAL EXAM
[Respiratory Effort] : normal respiratory effort [2+] : left 2+ [Abdomen Tenderness] : ~T ~M No abdominal tenderness [de-identified] : Appears well [de-identified] : Will groin and calf incisions appear well-healed.

## 2025-05-12 NOTE — HISTORY OF PRESENT ILLNESS
[FreeTextEntry1] :  Hospital Course: Discharge Date	28-Nov-2023 Admission Date	21-Nov-2023 21:02 Reason for Admission	left groin draining wound Hospital Course	 76 y/o F  with a history of   TEVAR for (TAAA) and AAA s/p EVAR on 10/19 , History of left le Ischemia ( acutely occluded left iliac graft) resulting in Right to left femoral ptfe bypass and left lower ext fasciotomies 11/5, and ORIF for left hip fracture on 11/7.  Patient sent to the ED for evaluation for suspected left groin wound infection.    - s/p left groin washout and sartorius muscle flap >>  Old and acute hematomas found in deep and superficial tissue, serosanguinous drainage -  OR cultures swab and tissue + PsA, f/u (S) cefepime - bcx ngtd - dc zosyn-->cefepime 2g IV q8h - d/w Dr Peter, concern for graft infection which cannot be removed - discharged on cefepime 2 g iv q8h via picc with weekly CBc CMp ESr CRP end 1/19/24.  - she completed abx - had f/u CTA which reported persistent fluid collection adjacent to graft anastomosis in left groin - ESr and crp had improved  patient is doing well and has no fever she has a wound on LLE for which she is getting wound care and was told she may need a vac she was sent here by vascular surgery to discuss chronic antibiotic suppression  Interval f/u 2/7/24- pt is tolerating cipro started probiotics feels some numbness at left groin, no pain  Interval f/u 3/11/24- pt is doing well no diarrhea no symptoms LLE wound healed -has a scab planning to go to [place] beginning of may having f/u Ct after that   Interval f/u 4/15/24 pt here for f/u doing well no diarrhea no fever on probiotics  xarelto changed to plavix doing acupuncture almost done with PT notes some pain at distal lle calf medial scar, states may have banged it going for cruise next month   Interval f/u 6/5/24- doing well no diarrhea saw Dr Peter had Ct- reviewed no signs of infection no fever did note some hip pain has to see [facility] tolerating cipro noted skin peeling only over arms and lower legs x 1 month ? photosensitivity  arms and calf with hypopigmented scars did note weight gain-using probiotics and yogurt not eating much  Interval f/u 11/18/24- pt underwent CTA found to have ongoing endoleaks and growth in descending thoracic aneursym she is being evaluated for TAMBE graft procedure likely mid winter-spring she is very nervous about complications and will be seeing Dr Loo again Friday to discuss compliant with cipro, not happy about the weight gain which she attributes to it no fever no diarrhea found to have PE and DVT, on eliquis  Interval f/u 2/10/25- pt is here for f/u visit tolerating cipro no diarrhea still has some pain in LLE , worse since stopping PT  Interval f/u 5/12 changed to xarelto no plan for surgery on ozempic  plan check labs c/w cipro f/u 3-4 months f/u vascualr [id number]

## 2025-05-12 NOTE — PHYSICAL EXAM
[General Appearance - Alert] : alert [General Appearance - In No Acute Distress] : in no acute distress [Auscultation Breath Sounds / Voice Sounds] : lungs were clear to auscultation bilaterally [Heart Rate And Rhythm] : heart rate was normal and rhythm regular [Heart Sounds] : normal S1 and S2 [Heart Sounds Gallop] : no gallops [Murmurs] : no murmurs [Heart Sounds Pericardial Friction Rub] : no pericardial rub [Bowel Sounds] : normal bowel sounds [Abdomen Soft] : soft [Abdomen Tenderness] : non-tender [] : no hepato-splenomegaly [Abdomen Mass (___ Cm)] : no abdominal mass palpated [Costovertebral Angle Tenderness] : no CVA tenderness [FreeTextEntry1] : left anterior calf healed scar medial calf healed scar. no erythema warmth or open wounds. ecchymoses right upper arm. age spot on face

## 2025-05-12 NOTE — ASSESSMENT
[FreeTextEntry1] : 76 y/o F  with a history of   TEVAR for (TAAA) and AAA s/p EVAR on 10/19 , History of left le Ischemia ( acutely occluded left iliac graft) resulting in Right to left femoral ptfe bypass and left lower ext fasciotomies 11/5, and ORIF for left hip fracture on 11/7.  Patient was sent to the ED for evaluation for suspected left groin wound infection.    - s/p left groin washout and sartorius muscle flap >>  Old and acute hematomas found in deep and superficial tissue, serosanguinous drainage -  OR cultures swab and tissue + PsA, f/u (S) cefepime keshawn 0.25 - bcx ngtd -  previously d/w Dr Peter, concern for PTFE graft infection which cannot be removed - discharged on cefepime 2 g iv q8h via picc with weekly CBc CMp ESr CRP end 1/19/24.  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 and ideally this should be removed - spoke to Dr Peter graft removal not feasible and may cause significant morbidity /mortality for the patient - therefore if she does not remain on suppressive antibiotics there is a high change infection can recur and should she become bacteremic she may never clear her cultures which can become life threatening - unfortunately the only oral option for Pseudomonas for suppression is quinolones - these have been associated with aneurysms - she already has TEVAR, EVAR PTFE grafts and left hip THR- many potential sights of seeding if infection recurs which will be very difficult to clear - she is awaiting TAMBIE graft placement due to ongoing endoleaks noted on latest CT. to be scheduled with [facility] and vascular surgery - in this case benefits outweigh risks of use. she will be frequently monitored with labs and ekg for qtc - she has now been on ciprofloxacin and is tolerating it well thus far - will check CBC CMP, CRP ESR, ekg  - she is aware of risk of qtc prolongation, aneurysms, tendon rupture, diarrhea, VOLODYMYR, c diff, death - c/w ciprofloxacin  po 250 mg bid - ekg reviewed qtc acceptable - f/u vascular to discuss repeat CTA  f/u 3 months prognosis guarded [Treatment Education] : treatment education [Treatment Adherence] : treatment adherence [Rx Dose / Side Effects] : Rx dose/side effects [Risk Reduction] : risk reduction [Drug Interactions / Side Effects] : drug interactions/side effects [Disclosure of Diagnosis] : disclosure of diagnosis [Anticipatory Guidance] : anticipatory guidance